# Patient Record
Sex: MALE | Race: WHITE | NOT HISPANIC OR LATINO | Employment: FULL TIME | URBAN - METROPOLITAN AREA
[De-identification: names, ages, dates, MRNs, and addresses within clinical notes are randomized per-mention and may not be internally consistent; named-entity substitution may affect disease eponyms.]

---
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
## 2018-01-02 ENCOUNTER — OFFICE VISIT (OUTPATIENT)
Dept: BEHAVIORAL HEALTH | Facility: PHYSICIAN GROUP | Age: 18
End: 2018-01-02
Payer: COMMERCIAL

## 2018-01-02 DIAGNOSIS — F41.1 GENERALIZED ANXIETY DISORDER: ICD-10-CM

## 2018-01-02 DIAGNOSIS — F32.9 MAJOR DEPRESSIVE DISORDER, SINGLE EPISODE WITH ANXIOUS DISTRESS: ICD-10-CM

## 2018-01-02 PROCEDURE — 90834 PSYTX W PT 45 MINUTES: CPT | Performed by: PSYCHOLOGIST

## 2018-01-02 NOTE — BH THERAPY
" [facility]  Therapy Progress Note    Patient Name: Anna Marie Prabhakar  Patient MRN: [id number]  Today's Date: 1/2/2018     Type of session:Individual psychotherapy  Length of session: 45 minutes  Persons in attendance:Patient    Subjective/New Info: Pt reports he's doing well, however he got an anonymous note wishing him a happy new year on his car and was a little fixated about if it came from his ex GF. Pt reports that he has already blocked her from his phone and social media and this is the only way to reach him. Disc how her actions are used to always draw him back into the drama and gave her a sense of power every time he allows himself to get drawn in. Pt on break, wants to do better in school next year and be more socially involved in school. Thinking of joining a  basketball league at school. Disc other ways he can get involved. Pt learning how to code from his father during break. Disc options for summer internships. 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 Still seems to be a little depressed in mood even though he denies this.     Objective/Observations:   Participation: Active verbal participation, Attentive and Engaged   Grooming: Casual   Cognition: Alert and Fully Oriented   Eye contact: Good   Mood: Depressed and Anxious   Affect: Constricted   Thought process: Logical   Speech: Rate within normal limits   Other:     Diagnoses:   1. Major depressive disorder, single episode with anxious distress    2. Generalized anxiety disorder         Current risk:   SUICIDE: Not applicable   Homicide: Not applicable   Self-harm: Not applicable   Relapse: Not applicable   Other:    Safety Plan reviewed? Not Indicated   If evidence of imminent risk is present, intervention/plan:     Therapeutic Intervention(s): Conflict clarification, Establish rapport, Goal-setting, Positive behavior reinforced, Self-care skills, Stressors assessed and Supportive psychotherapy    Treatment " Goal(s)/Objective(s) addressed: Tx plan:  - Utilize learned skills to manage mood more effectively  - Learn to successfully challenge & change distortions about self in thinking  - Identify goals/values and cultivate a meaningful life  - Learn to be more emotionally flexible/resilient in times of stress/challenges  - Eliminate SI & increase sense of hope/optimisim   - Learn to ameliorate effects of anxiety on life and functioning  - Increase behaviors of self-compassion and self-care      Progress toward Treatment Goals: Moderate improvement    Plan:  - Continue Individual therapy and Medication management    Marely Gupta, Ph.D.  1/2/2018

## 2018-01-18 ENCOUNTER — OFFICE VISIT (OUTPATIENT)
Dept: BEHAVIORAL HEALTH | Facility: PHYSICIAN GROUP | Age: 18
End: 2018-01-18
Payer: COMMERCIAL

## 2018-01-18 VITALS — RESPIRATION RATE: 14 BRPM | BODY MASS INDEX: 20.94 KG/M2 | HEIGHT: 73 IN | WEIGHT: 158 LBS

## 2018-01-18 DIAGNOSIS — F32.9 MAJOR DEPRESSIVE DISORDER, SINGLE EPISODE WITH ANXIOUS DISTRESS: ICD-10-CM

## 2018-01-18 DIAGNOSIS — F41.1 GENERALIZED ANXIETY DISORDER: ICD-10-CM

## 2018-01-18 PROCEDURE — 99213 OFFICE O/P EST LOW 20 MIN: CPT | Performed by: NURSE PRACTITIONER

## 2018-01-19 NOTE — PROGRESS NOTES
"[facility]  PSYCHIATRIC FOLLOW-UP NOTE    Name: Anna Marie Prabhakar  MRN: [id number]  : 2000  Age: 17 y.o.  Date of assessment: 2018  PCP: Gibran Pedraza M.D.  Persons in attendance: Patient  Total face-to-face time: 15 minutes    REASON FOR VISIT/CHIEF COMPLAINT (as stated by Patient):  Anna Marie Prabhakar is a 17 y.o., White male, attending follow-up appointment for depression and anxiety.      HISTORY OF PRESENT ILLNESS:    Patient reports overall he is feeling better. He did recently have a bad weekend with increased stress which caused him to feel low, but he was able to pull himself out of the low mood and his anxiety has since reduced as well. Continues to feel triggered by encounters with ex girlfriend, as she tends to engage in some manipulative behavior with him. With increased dose of medication finds it easier to stop obsessive thoughts and that if he has something that lowers his mood it is not lasting. Some fleeting passive suicidal ideation in response to stressors continues, but less than before medication. Sleeping 8 hours/night. Motivation to do things has improved and he is starting to enjoy things again.     PSYCHOSOCIAL CHANGES SINCE PREVIOUS CONTACT:  1 drink on New Years [place]. Lives with his parents. Full time student at college      MEDICATION SIDE EFFECTS: sweating and dry mouth    REVIEW OF SYSTEMS:      General appearance: casually dressed  male, good grooming/hygiene. Pleasant and cooperative   Constitutional negative - no fever/chills   Eyes not tested   Ears/Nose/Mouth/Throat not tested   Cardiovascular not tested   Respiratory not tested   Gastrointestinal negative   Genitourinary not tested   Muscular not tested   Integumentary not tested   Neurological negative   Endocrine not tested   Hematologic/Lymphatic not tested       PSYCHIATRIC EXAMINATION/MENTAL STATUS  Resp 14   Ht 1.85 m (6' 0.83\")   Wt 71.7 kg (158 lb)   BMI 20.94 kg/m²   Participation: Active " verbal participation, Attentive, Engaged and Open to feedback  Grooming:Casual and Neat  Orientation: Fully Oriented  Eye contact: Good  Behavior:Calm   Mood: Euthymic  Affect: Full range and Congruent with content  Thought process: Logical and Goal-directed  Thought content:  Within normal limits  Speech: Rate within normal limits and Volume within normal limits  Perception:  Within normal limits  Memory:  No gross evidence of memory deficits  Insight: Adequate  Judgment: Good  Family/couple interaction observations:   Other:    Current risk:    Suicide: Low   Homicide: Not applicable   Self-harm: Low  Relapse: Not applicable  Other:   Crisis Safety Plan reviewed?No  If evidence of imminent risk is present, intervention/plan:    Medical Records/Labs/Diagnostic Tests Reviewed: none    Medical Records/Labs/Diagnostic Tests Ordered: none    DIAGNOSTIC IMPRESSION(S):  1. Major depressive disorder, single episode with anxious distress    2. Generalized anxiety disorder           ASSESSMENT AND PLAN:  Continue with current medication. Discussed the automatic negative thoughts he has are good things to work on in therapy. Reviewed his side effect of sweating and dry mouth are common, patient verbalizes he is tolerating them and does not want to change medication.       Current Outpatient Prescriptions:   •  fluoxetine (PROZAC) 10 MG Cap, Take 3 Caps by mouth every day., Disp: 270 Cap, Rfl: 0      Recheck 2 months or sooner if needed    RETA Hassan.

## 2018-02-09 ENCOUNTER — OFFICE VISIT (OUTPATIENT)
Dept: BEHAVIORAL HEALTH | Facility: PHYSICIAN GROUP | Age: 18
End: 2018-02-09
Payer: COMMERCIAL

## 2018-02-09 DIAGNOSIS — F41.1 GENERALIZED ANXIETY DISORDER: ICD-10-CM

## 2018-02-09 DIAGNOSIS — F32.9 MAJOR DEPRESSIVE DISORDER, SINGLE EPISODE WITH ANXIOUS DISTRESS: ICD-10-CM

## 2018-02-09 PROCEDURE — 90834 PSYTX W PT 45 MINUTES: CPT | Performed by: PSYCHOLOGIST

## 2018-02-09 NOTE — BH THERAPY
[facility]  Therapy Progress Note    Patient Name: Anna Marie Prabhakar  Patient MRN: [id number]  Today's Date: 2/9/2018     Type of session:Individual psychotherapy  Length of session: 45 minutes  Persons in attendance:Patient    Subjective/New Info: Pt reports he's doing well, has told Ex when she gets in touch with him that he is no longer interested in cont a relationship with her, but conts to leave himself available for contact with her. Pt has felt happier. He is doing more socially - has started going pick-up basketball games and going to gym with a friend and going to a bible study group. Disc the changes in his insurance. Pt will talk to parents to see if he has out of network services. Disc possibility of using campus services as well.    Objective/Observations:   Participation: Active verbal participation, Attentive and Engaged   Grooming: Casual   Cognition: Alert and Fully Oriented   Eye contact: Good   Mood: Euthymic   Affect: Constricted   Thought process: Logical   Speech: Rate within normal limits   Other:     Diagnoses:   1. Major depressive disorder, single episode with anxious distress    2. Generalized anxiety disorder         Current risk:   SUICIDE: Not applicable   Homicide: Not applicable   Self-harm: Not applicable   Relapse: Not applicable   Other:    Safety Plan reviewed? Not Indicated   If evidence of imminent risk is present, intervention/plan:     Therapeutic Intervention(s): Conflict clarification, Establish rapport, Goal-setting, Positive behavior reinforced, Self-care skills, Stressors assessed and Supportive psychotherapy    Treatment Goal(s)/Objective(s) addressed: Tx plan:  - Utilize learned skills to manage mood more effectively  - Learn to successfully challenge & change distortions about self in thinking  - Identify goals/values and cultivate a meaningful life  - Learn to be more emotionally flexible/resilient in times of stress/challenges  - Eliminate SI & increase sense  of hope/optimisim   - Learn to ameliorate effects of anxiety on life and functioning  - Increase behaviors of self-compassion and self-care      Progress toward Treatment Goals: Moderate improvement    Plan:  - Continue Individual therapy and Medication management    Marely Gupta, Ph.D.  2/9/2018

## 2018-02-12 ENCOUNTER — OFFICE VISIT (OUTPATIENT)
Dept: BEHAVIORAL HEALTH | Facility: PHYSICIAN GROUP | Age: 18
End: 2018-02-12
Payer: COMMERCIAL

## 2018-02-12 DIAGNOSIS — F32.9 MAJOR DEPRESSIVE DISORDER, SINGLE EPISODE WITH ANXIOUS DISTRESS: ICD-10-CM

## 2018-02-12 DIAGNOSIS — F41.1 GENERALIZED ANXIETY DISORDER: ICD-10-CM

## 2018-02-12 PROCEDURE — 99212 OFFICE O/P EST SF 10 MIN: CPT | Performed by: NURSE PRACTITIONER

## 2018-02-13 NOTE — PROGRESS NOTES
[facility]  PSYCHIATRIC FOLLOW-UP NOTE    Name: Anna Marie Prabhakar  MRN: [id number]  : 2000  Age: 17 y.o.  Date of assessment: 2018  PCP: Gibran Pedraza M.D.  Persons in attendance: Patient, and NP student FRANCISCA Quick  Total face-to-face time: 10 minutes    REASON FOR VISIT/CHIEF COMPLAINT (as stated by Patient):  Anna Marie Prabhakar is a 17 y.o., White male, attending follow-up appointment for depression and anxiety.      HISTORY OF PRESENT ILLNESS:    Patient reports his mood is okay, denies feeling sad or down. Has not been obsessing about his ex, things are going well for him at school. Sleeping okay at night, appetite is good. No suicidal thoughts. No concerns or complaints about his medication fluoxetine. Patient wants to review his treatment plan for how often he will need to be seen, if he will be staying on medication indefinitely.     PSYCHOSOCIAL CHANGES SINCE PREVIOUS CONTACT:  Denies drinking or drug use      MEDICATION SIDE EFFECTS: sweating, patient says it is tolerable to him    REVIEW OF SYSTEMS:      General appearance: casually dressed  male, good grooming/hygiene. Pleasant and cooperative.   Constitutional negative - no fever/chills   Eyes not tested   Ears/Nose/Mouth/Throat not tested   Cardiovascular not tested   Respiratory not tested   Gastrointestinal negative - denies GI upset   Genitourinary not tested   Muscular not tested   Integumentary not tested   Neurological negative   Endocrine not tested   Hematologic/Lymphatic not tested       PSYCHIATRIC EXAMINATION/MENTAL STATUS  There were no vitals taken for this visit.  Participation: Active verbal participation  Grooming:Casual and Neat  Orientation: Fully Oriented  Eye contact: Good  Behavior:Calm   Mood: Euthymic  Affect: Full range and Congruent with content  Thought process: Logical and Goal-directed  Thought content:  Within normal limits  Speech: Rate within normal limits and Volume within normal limits  Perception:   Within normal limits  Memory:  No gross evidence of memory deficits  Insight: Good  Judgment: Good  Family/couple interaction observations:   Other:    Current risk:    Suicide: Low   Homicide: Not applicable   Self-harm: Low  Relapse: Not applicable  Other:   Crisis Safety Plan reviewed?No  If evidence of imminent risk is present, intervention/plan:    Medical Records/Labs/Diagnostic Tests Reviewed: none    Medical Records/Labs/Diagnostic Tests Ordered: none    DIAGNOSTIC IMPRESSION(S):  1. Major depressive disorder, single episode with anxious distress    2. Generalized anxiety disorder           ASSESSMENT AND PLAN: depression and anxiety are improved.   Continue fluoxetine at current dose. Discussed treatment plan with patient that we will keep him on his medication through one year of remission before trial at titrating off of meds. Encourage patient to continue with individual therapy, will be helpful to have a solid foundation of coping skills before thinking of going without medication in the future. Reviewed he can be seen every 3 months if his mood is stable, eventually every 6 months as long as his mood is good and he is not having problems with medication. Patient advised to call and schedule a visit if he is having changes or worsening mood.       Current Outpatient Prescriptions:   •  fluoxetine (PROZAC) 10 MG Cap, Take 3 Caps by mouth every day., Disp: 270 Cap, Rfl: 0    Recheck 3-4 months or sooner if needed    RETA Hassan.

## 2018-04-06 RX ORDER — FLUOXETINE 10 MG/1
CAPSULE ORAL
Qty: 270 CAP | Refills: 0 | Status: SHIPPED | OUTPATIENT
Start: 2018-04-06 | End: 2018-06-04

## 2018-04-16 RX ORDER — FLUOXETINE 10 MG/1
CAPSULE ORAL
Qty: 270 CAP | Refills: 0 | Status: SHIPPED | OUTPATIENT
Start: 2018-04-16 | End: 2018-06-04

## 2018-06-04 ENCOUNTER — OFFICE VISIT (OUTPATIENT)
Dept: BEHAVIORAL HEALTH | Facility: PHYSICIAN GROUP | Age: 18
End: 2018-06-04
Payer: COMMERCIAL

## 2018-06-04 DIAGNOSIS — F32.9 MAJOR DEPRESSIVE DISORDER, SINGLE EPISODE WITH ANXIOUS DISTRESS: ICD-10-CM

## 2018-06-04 DIAGNOSIS — F41.1 GENERALIZED ANXIETY DISORDER: ICD-10-CM

## 2018-06-04 PROCEDURE — 99213 OFFICE O/P EST LOW 20 MIN: CPT | Performed by: NURSE PRACTITIONER

## 2018-06-04 RX ORDER — FLUOXETINE HYDROCHLORIDE 40 MG/1
40 CAPSULE ORAL DAILY
Qty: 90 CAP | Refills: 0 | Status: SHIPPED | OUTPATIENT
Start: 2018-06-04

## 2018-06-05 NOTE — PROGRESS NOTES
"[facility]  PSYCHIATRIC FOLLOW-UP NOTE    Name: Anna Marie Prabhakar  MRN: [id number]  : 2000  Age: 17 y.o.  Date of assessment: 2018  PCP: Gibran Pedraza M.D.  Persons in attendance: Patient  Total face-to-face time: 20 minutes    REASON FOR VISIT/CHIEF COMPLAINT (as stated by Patient):  Anna Marie Prabhakar is a 17 y.o., White male, attending follow-up appointment for recheck of depression and anxiety.      HISTORY OF PRESENT ILLNESS:    Anna Marie complains his mood has been a little lower since he finished school this semester. Grades ended up okay, says he really likes college and his classes and learning. He has had feelings of indifference, says he does not have suicidal ideation or thoughts of harming himself, but he doesn't care at times if he lives. He does enjoy time out with his friends, is working 2 jobs right now. Anxiety remains improved, has some situational anxiety at times but nothing that \"keeps me awake at night\" or is interfering with his level of function. Since his insurance became out of network he has not been seeing a therapist. Continues to obsess about his ex girlfriend, has difficulty stopping thinking about her when she does things like attempt to contact him through social media.    PSYCHOSOCIAL CHANGES SINCE PREVIOUS CONTACT:  No drinking or drug use      MEDICATION SIDE EFFECTS: sweating    REVIEW OF SYSTEMS:    General appearance: casually dressed      Constitutional negative - no fever/chills   Eyes not tested   Ears/Nose/Mouth/Throat not tested   Cardiovascular not tested   Respiratory negative - no shortness of breath or acute distress   Gastrointestinal negative   Genitourinary not tested   Muscular not tested   Integumentary not tested   Neurological negative   Endocrine not tested   Hematologic/Lymphatic not tested       PSYCHIATRIC EXAMINATION/MENTAL STATUS  There were no vitals taken for this visit.  Participation: Active verbal participation, Attentive, " Engaged and Open to feedback  Grooming:Casual and Neat  Orientation: Fully Oriented  Eye contact: Good  Behavior:Calm   Mood: Depressed  Affect: Full range and Congruent with content  Thought process: Logical and Goal-directed  Thought content:  Within normal limits  Speech: Rate within normal limits and Volume within normal limits  Perception:  Within normal limits  Memory:  No gross evidence of memory deficits  Insight: Adequate  Judgment: Good  Family/couple interaction observations:   Other:    Current risk:    Suicide: Low   Homicide: Not applicable   Self-harm: Low  Relapse: Not applicable  Other:   Crisis Safety Plan reviewed?No  If evidence of imminent risk is present, intervention/plan:    Medical Records/Labs/Diagnostic Tests Reviewed: none    Medical Records/Labs/Diagnostic Tests Ordered: none    DIAGNOSTIC IMPRESSION(S):  1. Major depressive disorder, single episode with anxious distress    2. Generalized anxiety disorder           ASSESSMENT AND PLAN:  -depression is mild but persisting, anxiety has improved.   -increase fluoxetine to 40 mg daily for symptoms  -stress need to get in with a therapist in network, is given a couple of names he can try, needs to continue to work on stopping thoughts about ex girlfriend and using boundaries with her.       Current Outpatient Prescriptions:   •  fluoxetine (PROZAC) 40 MG capsule, Take 1 Cap by mouth every day., Disp: 90 Cap, Rfl: 0    Recheck 4 weeks or sooner if needed    RETA Hassan.

## 2018-06-28 ENCOUNTER — OFFICE VISIT (OUTPATIENT)
Dept: BEHAVIORAL HEALTH | Facility: PHYSICIAN GROUP | Age: 18
End: 2018-06-28
Payer: COMMERCIAL

## 2018-06-28 DIAGNOSIS — F32.9 MAJOR DEPRESSIVE DISORDER, SINGLE EPISODE WITH ANXIOUS DISTRESS: ICD-10-CM

## 2018-06-28 DIAGNOSIS — F41.1 GENERALIZED ANXIETY DISORDER: ICD-10-CM

## 2018-06-28 PROCEDURE — 99213 OFFICE O/P EST LOW 20 MIN: CPT | Performed by: NURSE PRACTITIONER

## 2018-06-28 NOTE — PROGRESS NOTES
[facility]  PSYCHIATRIC FOLLOW-UP NOTE    Name: Anna Marie Prabhakar  MRN: [id number]  : 2000  Age: 17 y.o.  Date of assessment: 2018  PCP: Gibran Pedraza M.D.  Persons in attendance: Patient  Total face-to-face time: 13 minutes    REASON FOR VISIT/CHIEF COMPLAINT (as stated by Patient):  Anna Marie Prabhakar is a 17 y.o., White male, attending follow-up appointment for recheck of depression and anxiety.      HISTORY OF PRESENT ILLNESS:    Anna Marie reports his mood is improved with increased dose of fluoxetine. He is feeling less down, more interested in doing things again and is going skydiving in the next week with his stepfather, something he has wanted to do for a long time. Still continues to dwell on negative thoughts when he is not busy, finds that he thinks often of his ex girlfriend when he is not busy with work or friends. Denies suicidal thoughts. Appetite and sleep have been okay.     PSYCHOSOCIAL CHANGES SINCE PREVIOUS CONTACT:  Denies drinking      MEDICATION SIDE EFFECTS: GI upset, less if he takes fluoxetine with food    REVIEW OF SYSTEMS:      General appearance: casually dressed  female, good grooming/hygiene. Pleasant and cooperative.   Constitutional negative - no fever/chills   Eyes not tested   Ears/Nose/Mouth/Throat not tested   Cardiovascular not tested   Respiratory negative - no shortness of breath or acute distress   Gastrointestinal Some GI upset,    Genitourinary not tested   Muscular not tested   Integumentary not tested   Neurological negative   Endocrine not tested   Hematologic/Lymphatic not tested       PSYCHIATRIC EXAMINATION/MENTAL STATUS  There were no vitals taken for this visit.  Participation: Active verbal participation, Attentive, Engaged and Open to feedback  Grooming:Casual and Neat  Orientation: Fully Oriented  Eye contact: Good  Behavior:Calm   Mood: Euthymic  Affect: Full range and Congruent with content  Thought process: Logical and  Goal-directed  Thought content:  Within normal limits  Speech: Rate within normal limits and Volume within normal limits  Perception:  Within normal limits  Memory:  No gross evidence of memory deficits  Insight: Good  Judgment: Good  Family/couple interaction observations:   Other:    Current risk:    Suicide: Low   Homicide: Not applicable   Self-harm: Not applicable  Relapse: Not applicable  Other:   Crisis Safety Plan reviewed?No  If evidence of imminent risk is present, intervention/plan:    Medical Records/Labs/Diagnostic Tests Reviewed: none    Medical Records/Labs/Diagnostic Tests Ordered: none    DIAGNOSTIC IMPRESSION(S):  1. Major depressive disorder, single episode with anxious distress    2. Generalized anxiety disorder           ASSESSMENT AND PLAN:  Mood is improved, continue with fluoxetine 40 mg and continue to monitor his mood.  -encouraged to continue to work on thought stopping when he is dwelling on his ex girlfriend  -patient will work at establishing with a therapist, feels his schedule will allow him to get some regularly scheduled therapy visits in      Current Outpatient Prescriptions:   •  fluoxetine (PROZAC) 40 MG capsule, Take 1 Cap by mouth every day., Disp: 90 Cap, Rfl: 0    Recheck approximately 8 weeks or sooner if needed    RETA Hassan.

## 2018-09-07 ENCOUNTER — APPOINTMENT (OUTPATIENT)
Dept: BEHAVIORAL HEALTH | Facility: PHYSICIAN GROUP | Age: 18
End: 2018-09-07
Payer: COMMERCIAL

## 2020-03-17 ENCOUNTER — TELEPHONE (OUTPATIENT)
Dept: HEALTH INFORMATION MANAGEMENT | Facility: OTHER | Age: 20
End: 2020-03-17

## 2020-03-17 NOTE — TELEPHONE ENCOUNTER
1. Caller Name: Anna Marie                        Call Back Number: [phone]  [facility] PCP or Specialty Provider: Yes Dr. Pedraza        2.  Does patient have any active symptoms of respiratory illness (fever OR cough OR shortness of breath)? Yes, the patient reports the following respiratory symptoms: cough x 4 days. No fever, No SOB    3.  Does patient have any comoribidities? None     4.  In the last 30 days, has the patient traveled outside of the country OR in a high risk area within the [place] OR have any known contact with someone who has or is suspected to have COVID-19?  Yes, returned from [place] last night. Studying abroad but program cancelled. [employer] put on 14 day quarantine.      5. POTENTIAL PUI (LOW): Advised patient to contact [facility] ( [phone]) for additional screening; patient also given self care instructions        Note routed to PCP: FYI only.

## 2020-03-21 ENCOUNTER — OFFICE VISIT (OUTPATIENT)
Dept: URGENT CARE | Facility: CLINIC | Age: 20
End: 2020-03-21
Payer: COMMERCIAL

## 2020-03-21 VITALS
HEART RATE: 89 BPM | OXYGEN SATURATION: 95 % | RESPIRATION RATE: 16 BRPM | TEMPERATURE: 99.4 F | HEIGHT: 72 IN | BODY MASS INDEX: 21.67 KG/M2 | WEIGHT: 160 LBS

## 2020-03-21 DIAGNOSIS — B97.89 VIRAL RESPIRATORY ILLNESS: ICD-10-CM

## 2020-03-21 DIAGNOSIS — J98.8 VIRAL RESPIRATORY ILLNESS: ICD-10-CM

## 2020-03-21 DIAGNOSIS — R05.9 COUGH: ICD-10-CM

## 2020-03-21 PROBLEM — M25.319 INSTABILITY OF SHOULDER JOINT: Status: ACTIVE | Noted: 2019-11-14

## 2020-03-21 LAB
FLUAV+FLUBV AG SPEC QL IA: NORMAL
INT CON NEG: NEGATIVE
INT CON NEG: NEGATIVE
INT CON POS: POSITIVE
INT CON POS: POSITIVE
S PYO AG THROAT QL: NORMAL

## 2020-03-21 PROCEDURE — 87880 STREP A ASSAY W/OPTIC: CPT | Performed by: NURSE PRACTITIONER

## 2020-03-21 PROCEDURE — 99203 OFFICE O/P NEW LOW 30 MIN: CPT | Performed by: NURSE PRACTITIONER

## 2020-03-21 PROCEDURE — 87804 INFLUENZA ASSAY W/OPTIC: CPT | Performed by: NURSE PRACTITIONER

## 2020-03-21 ASSESSMENT — ENCOUNTER SYMPTOMS
FOCAL WEAKNESS: 0
HEADACHES: 1
FEVER: 1
SPEECH CHANGE: 0
MYALGIAS: 1
SENSORY CHANGE: 0
SINUS PAIN: 0
WHEEZING: 0
SHORTNESS OF BREATH: 0
SORE THROAT: 1
WEAKNESS: 0
SPUTUM PRODUCTION: 0
COUGH: 1
HEMOPTYSIS: 0

## 2020-03-21 NOTE — PATIENT INSTRUCTIONS
Travel to high risk area or direct contact with known COVID-19 positive person: Self isolation for 14 days from onset of symptoms. Stay home while symptomatic. .    For specific questions regarding COVID-19 testing: reach out to your local health department or utilize their online resources. For [facility] fill out the online survey or call [phone].       Viral Respiratory Infection  A respiratory infection is an illness that affects part of the respiratory system, such as the lungs, nose, or throat. Most respiratory infections are caused by either viruses or bacteria. A respiratory infection that is caused by a virus is called a viral respiratory infection.  Common types of viral respiratory infections include:  · A cold.  · The flu (influenza).  · A respiratory syncytial virus (RSV) infection.  How do I know if I have a viral respiratory infection?  Most viral respiratory infections cause:  · A stuffy or runny nose.  · Yellow or green nasal discharge.  · A cough.  · Sneezing.  · Fatigue.  · Achy muscles.  · A sore throat.  · Sweating or chills.  · A fever.  · A headache.  How are viral respiratory infections treated?  If influenza is diagnosed early, it may be treated with an antiviral medicine that shortens the length of time a person has symptoms. Symptoms of viral respiratory infections may be treated with over-the-counter and prescription medicines, such as:  · Expectorants. These make it easier to cough up mucus.  · Decongestant nasal sprays.  Health care providers do not prescribe antibiotic medicines for viral infections. This is because antibiotics are designed to kill bacteria. They have no effect on viruses.  How do I know if I should stay home from work or school?  To avoid exposing others to your respiratory infection, stay home if you have:  · A fever.  · A persistent cough.  · A sore throat.  · A runny nose.  · Sneezing.  · Muscles  aches.  · Headaches.  · Fatigue.  · Weakness.  · Chills.  · Sweating.  · Nausea.  Follow these instructions at home:  · Rest as much as possible.  · Take over-the-counter and prescription medicines only as told by your health care provider.  · Drink enough fluid to keep your urine clear or pale yellow. This helps prevent dehydration and helps loosen up mucus.  · Gargle with a salt-water mixture 3-4 times per day or as needed. To make a salt-water mixture, completely dissolve ½-1 tsp of salt in 1 cup of warm water.  · Use nose drops made from salt water to ease congestion and soften raw skin around your nose.  · Do not drink alcohol.  · Do not use tobacco products, including cigarettes, chewing tobacco, and e-cigarettes. If you need help quitting, ask your health care provider.  Contact a health care provider if:  · Your symptoms last for 10 days or longer.  · Your symptoms get worse over time.  · You have a fever.  · You have severe sinus pain in your face or forehead.  · The glands in your jaw or neck become very swollen.  Get help right away if:  · You feel pain or pressure in your chest.  · You have shortness of breath.  · You faint or feel like you will faint.  · You have severe and persistent vomiting.  · You feel confused or disoriented.  This information is not intended to replace advice given to you by your health care provider. Make sure you discuss any questions you have with your health care provider.  Document Released: 09/27/2006 Document Revised: 05/25/2017 Document Reviewed: 05/25/2016  [employer] Patient Education © 2017 [employer]    Self-Isolating at Home  If it is determined that you do not need to be hospitalized and can be isolated at home please follow the follow the prevention steps below as based on CDC guidelines.  Stay home except to get medical care  People who are mildly ill with unconfirmed COVID-19 or have any other infectious respiratory illness are able to isolate at home  during their illness. You should restrict activities outside your home, except for getting medical care. Do not go to work, school, or public areas. Avoid using public transportation, ride-sharing, or taxis.  Call ahead before visiting your doctor  If you have a medical appointment, call the healthcare provider and tell them that you have or may have unconfirmed COVID-19 or another possibly contagious respiratory illness. This will help the healthcare provider’s office take steps to keep other people from getting infected or exposed.  Separate yourself from other people and animals in your home  As much as possible, you should stay in a specific room and away from other people in your home. Also, you should use a separate bathroom, if available.  You should restrict contact with pets and other animals while you are sick, just like you would around other people. When possible, have another member of your household care for your animals while you are sick. If you must care for your pet or be around animals while you are sick, wash your hands before and after you interact with pets.  Wear a facemask  You should wear a facemask when you are around other people (e.g., sharing a room or vehicle) or pets and before you enter a healthcare provider’s office. If you are not able to wear a facemask (for example, because it causes trouble breathing), then people who live with you should not stay in the same room with you, or they should wear a facemask if they enter your room.  Cover your coughs and sneezes  Cover your mouth and nose with a tissue when you cough or sneeze. Throw used tissues in a lined trash can. Immediately wash your hands with soap and water for at least 20 seconds or, if soap and water are not available, clean your hands with an alcohol-based hand  that contains at least 60% alcohol.  Clean your hands often  Wash your hands often with soap and water for at least 20 seconds, especially after blowing  your nose, coughing, or sneezing; going to the bathroom; and before eating or preparing food. If soap and water are not readily available, use an alcohol-based hand  with at least 60% alcohol, covering all surfaces of your hands and rubbing them together until they feel dry.  Soap and water are the best option if hands are visibly dirty. Avoid touching your eyes, nose, and mouth with unwashed hands.  Avoid sharing personal household items  You should not share dishes, drinking glasses, cups, eating utensils, towels, or bedding with other people or pets in your home. After using these items, they should be washed thoroughly with soap and water.  Clean all “high-touch” surfaces everyday  High touch surfaces include counters, tabletops, doorknobs, bathroom fixtures, toilets, phones, keyboards, tablets, and bedside tables. Also, clean any surfaces that may have blood, stool, or body fluids on them. Use a household cleaning spray or wipe, according to the label instructions. Labels contain instructions for safe and effective use of the cleaning product including precautions you should take when applying the product, such as wearing gloves and making sure you have good ventilation during use of the product.  Monitor your symptoms  Seek prompt medical attention if your illness is worsening (e.g., difficulty breathing). Before seeking care, call your healthcare provider and tell them that you have, or are being evaluated for, unconfirmed COVID-19 or another infectious respiratory illness. Put on a facemask before you enter the facility. These steps will help the healthcare provider’s office to keep other people in the office or waiting room from getting infected or exposed. Ask your healthcare provider to call the local or [facility] health department. Persons who are placed under active monitoring or facilitated self-monitoring should follow instructions provided by their local health department or occupational health  professionals, as appropriate. When working with your local health department check their available hours.  If you have a medical emergency and need to call 911, notify the dispatch personnel that you have, or are being evaluated for unconfirmed COVID-19 or another infectious respiratory illness. If possible, put on a facemask before emergency medical services arrive.  Discontinuing home isolation  Patients with unconfirmed COVID-19 or other infectious respiratory illnesses should remain under home isolation precautions until the risk of secondary transmission to others is thought to be low. In general that means 72 hours after fever resolves without the use of fever reducing medications, AND symptoms have improved AND at least 7 days since symptoms first appeared. If you have questions or concerns consult your healthcare providers or your local health department.  Per CDC guidelines, you are not required to provide a healthcare provider’s note to validate your illness or to return to work, as healthcare provider offices and medical facilities may be extremely busy and not able to provide such documentation in a timely way.

## 2020-03-21 NOTE — PROGRESS NOTES
"  Subjective:     Anna Marie Prabhakar is a 19 y.o. male who presents for Cough (travelled to [place] came back 3/16/2020 - symptoms started 3/13/2020 - Cough, fever, Chest pressure, body aches, sore throat)      In [place] 1/2/2020-till 3/16/2020. Study abroad in [place]. Also traveled to [place], [place], [place]. Last in [place] 3/15.  Symptoms started 3/13. Wet cough initially, 5 days. Fevers, sore throat, very weak cough, chest heaviness now. No SOB. Last fever last night, unknown temp. Body aches. Pain 3/10. No known COVID exposure. Cough was initially productive. Taken Tylenol. Reached out to [facility], told he would not be tested, \"Saving for elderly\". Concerned since he has a immunocompromised brother. Has been staying in a hotel, away form family.     Cough   This is a new problem. The current episode started 1 to 4 weeks ago. The problem has been gradually improving. The cough is non-productive. Associated symptoms include a fever, headaches, myalgias and a sore throat. Pertinent negatives include no ear pain, hemoptysis, rash, shortness of breath or wheezing. Nothing aggravates the symptoms. Risk factors for lung disease include travel. He has tried rest for the symptoms. The treatment provided moderate relief. There is no history of asthma.       Past Medical History:   Diagnosis Date   • Anxiety    • Depression        Past Surgical History:   Procedure Laterality Date   • FRENULUM CLIPPING  2009       Social History     Socioeconomic History   • Marital status: Single     Spouse name: Not on file   • Number of children: Not on file   • Years of education: Not on file   • Highest education level: Not on file   Occupational History   • Not on file   Social Needs   • Financial resource strain: Not on file   • Food insecurity     Worry: Not on file     Inability: Not on file   • Transportation needs     Medical: Not on file     Non-medical: Not on file   Tobacco Use   • Smoking status: Current Some Day Smoker "   • Smokeless tobacco: Never Used   • Tobacco comment: vapes, quit cigarettes   Substance and Sexual Activity   • Alcohol use: Yes     Comment: 1 drink on New Year's [employer]   • Drug use: No     Comment: denies    • Sexual activity: Not on file   Lifestyle   • Physical activity     Days per week: Not on file     Minutes per session: Not on file   • Stress: Not on file   Relationships   • Social connections     Talks on phone: Not on file     Gets together: Not on file     Attends [employer] service: Not on file     Active member of club or organization: Not on file     Attends meetings of clubs or organizations: Not on file     Relationship status: Not on file   • Intimate partner violence     Fear of current or ex partner: Not on file     Emotionally abused: Not on file     Physically abused: Not on file     Forced sexual activity: Not on file   Other Topics Concern   • Not on file   Social History Narrative   • Not on file        Family History   Problem Relation Age of Onset   • Anxiety disorder Mother    • Drug abuse Brother    • Anxiety disorder Maternal Grandfather         No Known Allergies    Review of Systems   Constitutional: Positive for fever.   HENT: Positive for sore throat. Negative for ear discharge, ear pain and sinus pain.    Respiratory: Positive for cough. Negative for hemoptysis, sputum production, shortness of breath and wheezing.    Musculoskeletal: Positive for myalgias.   Skin: Negative for rash.   Neurological: Positive for headaches. Negative for sensory change, speech change, focal weakness and weakness.   All other systems reviewed and are negative.       Objective:   Pulse 89   Temp 37.4 °C (99.4 °F) (Temporal)   Resp 16   Ht 1.829 m (6')   Wt 72.6 kg (160 lb)   SpO2 95%   BMI 21.70 kg/m²     Physical Exam  Vitals signs reviewed.   Constitutional:       General: He is not in acute distress.     Appearance: Normal appearance. He is well-developed. He is not toxic-appearing.   HENT:       Head: Normocephalic and atraumatic.      Right Ear: Tympanic membrane, ear canal and external ear normal.      Left Ear: Tympanic membrane, ear canal and external ear normal.      Nose: Mucosal edema present.      Right Sinus: No maxillary sinus tenderness or frontal sinus tenderness.      Left Sinus: No maxillary sinus tenderness or frontal sinus tenderness.      Mouth/Throat:      Mouth: Mucous membranes are moist.      Pharynx: Uvula midline. Posterior oropharyngeal erythema present. No pharyngeal swelling or uvula swelling.      Comments: Slight oropharyngeal erythema.   Eyes:      Conjunctiva/sclera: Conjunctivae normal.   Neck:      Musculoskeletal: Normal range of motion and neck supple. No neck rigidity.   Cardiovascular:      Rate and Rhythm: Normal rate and regular rhythm.   Pulmonary:      Effort: Pulmonary effort is normal. No accessory muscle usage, prolonged expiration, respiratory distress or retractions.      Breath sounds: Normal breath sounds. No stridor. No decreased breath sounds, wheezing, rhonchi or rales.   Musculoskeletal: Normal range of motion.   Lymphadenopathy:      Head:      Right side of head: No submental, submandibular, tonsillar, preauricular, posterior auricular or occipital adenopathy.      Left side of head: No submental, submandibular, tonsillar, preauricular, posterior auricular or occipital adenopathy.   Skin:     General: Skin is warm and dry.      Findings: No rash.   Neurological:      General: No focal deficit present.      Mental Status: He is alert and oriented to person, place, and time.      GCS: GCS eye subscore is 4. GCS verbal subscore is 5. GCS motor subscore is 6.   Psychiatric:         Mood and Affect: Mood normal.         Speech: Speech normal.         Behavior: Behavior normal.         Thought Content: Thought content normal.         Judgment: Judgment normal.         Assessment/Plan:   1. Viral respiratory illness    2. Cough  - POCT Influenza A/B  - POCT  Rapid Strep A  Results for orders placed or performed in visit on 03/21/20   POCT Influenza A/B   Result Value Ref Range    Rapid Influenza A-B NEG     Internal Control Positive Positive     Internal Control Negative Negative    POCT Rapid Strep A   Result Value Ref Range    Rapid Strep Screen NEG     Internal Control Positive Positive     Internal Control Negative Negative      Travel to high risk area or direct contact with known COVID-19 positive person: Self isolation for 14 days from onset of symptoms. Stay home while symptomatic. For specific questions regarding COVID-19 testing: reach out to your local health department or utilize their online resources. For [facility] fill out the online survey or call [phone].    -Discussed viral etiology.    Symptomatic care.  -Oral hydration and rest.   -Over the counter expectorant as directed; Guaifenesin (Mucinex).  -Diphenhydramine as directed for rhinorrhea (runny nose) and sneezing.  --May take over the counter pseudoephedrine for nasal congestion. Can increase your blood pressure.  -Tylenol or ibuprofen for pain and fever as directed.   -Saline nasal spray as a decongestant.    Follow up for shortness of breath, fevers, elevated heart rate, weakness, prolonged cough, chest pain, or any other concerns.    Differential diagnosis, natural history, supportive care, and indications for immediate follow-up discussed.

## 2021-04-03 ENCOUNTER — IMMUNIZATION (OUTPATIENT)
Dept: FAMILY PLANNING/WOMEN'S HEALTH CLINIC | Facility: IMMUNIZATION CENTER | Age: 21
End: 2021-04-03
Payer: COMMERCIAL

## 2021-04-03 DIAGNOSIS — Z23 ENCOUNTER FOR VACCINATION: Primary | ICD-10-CM

## 2021-04-03 PROCEDURE — 91300 PFIZER SARS-COV-2 VACCINE: CPT

## 2021-04-03 PROCEDURE — 0001A PFIZER SARS-COV-2 VACCINE: CPT

## 2021-04-24 ENCOUNTER — IMMUNIZATION (OUTPATIENT)
Dept: FAMILY PLANNING/WOMEN'S HEALTH CLINIC | Facility: IMMUNIZATION CENTER | Age: 21
End: 2021-04-24
Payer: COMMERCIAL

## 2021-04-24 DIAGNOSIS — Z23 ENCOUNTER FOR VACCINATION: Primary | ICD-10-CM

## 2021-04-24 PROCEDURE — 0002A PFIZER SARS-COV-2 VACCINE: CPT

## 2021-04-24 PROCEDURE — 91300 PFIZER SARS-COV-2 VACCINE: CPT

## 2021-08-07 ENCOUNTER — HOSPITAL ENCOUNTER (EMERGENCY)
Facility: MEDICAL CENTER | Age: 21
End: 2021-08-07
Attending: EMERGENCY MEDICINE
Payer: COMMERCIAL

## 2021-08-07 VITALS
SYSTOLIC BLOOD PRESSURE: 132 MMHG | HEART RATE: 74 BPM | DIASTOLIC BLOOD PRESSURE: 83 MMHG | BODY MASS INDEX: 24.46 KG/M2 | WEIGHT: 180.56 LBS | OXYGEN SATURATION: 97 % | RESPIRATION RATE: 18 BRPM | HEIGHT: 72 IN | TEMPERATURE: 98.8 F

## 2021-08-07 DIAGNOSIS — S01.81XA FACIAL LACERATION, INITIAL ENCOUNTER: ICD-10-CM

## 2021-08-07 PROCEDURE — 700111 HCHG RX REV CODE 636 W/ 250 OVERRIDE (IP): Performed by: EMERGENCY MEDICINE

## 2021-08-07 PROCEDURE — 99282 EMERGENCY DEPT VISIT SF MDM: CPT

## 2021-08-07 PROCEDURE — 90471 IMMUNIZATION ADMIN: CPT

## 2021-08-07 PROCEDURE — 90715 TDAP VACCINE 7 YRS/> IM: CPT | Performed by: EMERGENCY MEDICINE

## 2021-08-07 PROCEDURE — 304217 HCHG IRRIGATION SYSTEM

## 2021-08-07 PROCEDURE — 303747 HCHG EXTRA SUTURE

## 2021-08-07 PROCEDURE — 304999 HCHG REPAIR-SIMPLE/INTERMED LEVEL 1

## 2021-08-07 PROCEDURE — 700101 HCHG RX REV CODE 250: Performed by: EMERGENCY MEDICINE

## 2021-08-07 RX ORDER — LIDOCAINE HYDROCHLORIDE 10 MG/ML
5 INJECTION, SOLUTION INFILTRATION; PERINEURAL ONCE
Status: COMPLETED | OUTPATIENT
Start: 2021-08-07 | End: 2021-08-07

## 2021-08-07 RX ADMIN — LIDOCAINE HYDROCHLORIDE 5 ML: 10 INJECTION, SOLUTION INFILTRATION; PERINEURAL at 02:44

## 2021-08-07 RX ADMIN — CLOSTRIDIUM TETANI TOXOID ANTIGEN (FORMALDEHYDE INACTIVATED), CORYNEBACTERIUM DIPHTHERIAE TOXOID ANTIGEN (FORMALDEHYDE INACTIVATED), BORDETELLA PERTUSSIS TOXOID ANTIGEN (GLUTARALDEHYDE INACTIVATED), BORDETELLA PERTUSSIS FILAMENTOUS HEMAGGLUTININ ANTIGEN (FORMALDEHYDE INACTIVATED), BORDETELLA PERTUSSIS PERTACTIN ANTIGEN, AND BORDETELLA PERTUSSIS FIMBRIAE 2/3 ANTIGEN 0.5 ML: 5; 2; 2.5; 5; 3; 5 INJECTION, SUSPENSION INTRAMUSCULAR at 03:00

## 2021-08-07 ASSESSMENT — LIFESTYLE VARIABLES
CONSUMPTION TOTAL: NEGATIVE
TOTAL SCORE: 0
HAVE PEOPLE ANNOYED YOU BY CRITICIZING YOUR DRINKING: NO
EVER HAD A DRINK FIRST THING IN THE MORNING TO STEADY YOUR NERVES TO GET RID OF A HANGOVER: NO
AVERAGE NUMBER OF DAYS PER WEEK YOU HAVE A DRINK CONTAINING ALCOHOL: 0
HAVE YOU EVER FELT YOU SHOULD CUT DOWN ON YOUR DRINKING: NO
HOW MANY TIMES IN THE PAST YEAR HAVE YOU HAD 5 OR MORE DRINKS IN A DAY: 0
ON A TYPICAL DAY WHEN YOU DRINK ALCOHOL HOW MANY DRINKS DO YOU HAVE: 1
DO YOU DRINK ALCOHOL: YES
DOES PATIENT WANT TO STOP DRINKING: NO
TOTAL SCORE: 0
TOTAL SCORE: 0
EVER FELT BAD OR GUILTY ABOUT YOUR DRINKING: NO

## 2021-08-07 ASSESSMENT — PAIN DESCRIPTION - PAIN TYPE: TYPE: ACUTE PAIN
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
## 2021-08-07 NOTE — ED NOTES
Discharge instructions given to patient. Education provided on diagnosis, treatment, and follow up provided. Pt verbalized understanding. All questions answered. Pt ambulatory to lobby.
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
## 2023-09-20 NOTE — PROGRESS NOTES
"[facility]  PSYCHIATRIC FOLLOW-UP NOTE    Name: Anna Marie Prabhakar  MRN: [id number]  : 2000  Age: 16 y.o.  Date of assessment: 2017  PCP: Gibran Pedraza M.D.  Persons in attendance: Patient  REASON FOR VISIT/CHIEF COMPLAINT (as stated by Patient):  Anna Marie Prabhakar is a 16 y.o., White male, attending follow-up appointment for   Chief Complaint   Patient presents with   • Medication Management     The patient is seen today for follow up and he is taking lexapro 10 mg per day.   .      HISTORY OF PRESENT ILLNESS:    This is a 17 yo male, seen today for follow up. The patient was seen for initial evaluation one month ago and he was started on lexapro. The patient reported that he is taking it everyday and he denied any side effects. The patient has dee very active and he has been helping his uncle.  The patient has been sleeping better and he is trying to move on.     PSYCHOSOCIAL CHANGES SINCE PREVIOUS CONTACT:  The patient started to feel better.    RESPONSE TO TREATMENT:  Good.    MEDICATION SIDE EFFECTS:  Denied.    REVIEW OF SYSTEMS:        Constitutional negative   Eyes negative   Ears/Nose/Mouth/Throat negative   Cardiovascular negative   Respiratory negative   Gastrointestinal negative   Genitourinary negative   Muscular negative   Integumentary negative   Neurological negative   Endocrine negative   Hematologic/Lymphatic negative       PSYCHIATRIC EXAMINATION/MENTAL STATUS  /78 mmHg  Pulse 80  Temp(Src) 36.6 °C (97.9 °F)  Resp 14  Ht 1.85 m (6' 0.83\")  Wt 70.308 kg (155 lb)  BMI 20.54 kg/m2  Participation: Active verbal participation, Attentive and Engaged  Grooming:Casual  Orientation: Alert and Fully Oriented  Eye contact: Good  Behavior:Calm   Mood: Anxious  Affect: Anxious  Thought process: Logical and Goal-directed  Thought content:  Within normal limits  Speech: Rate within normal limits and Volume within normal limits  Perception:  Within normal limits  Memory:  No gross " FYI monitoring a left upper lobe nodule.  Likely heading towards biopsy at some point. evidence of memory deficits  Insight: Good  Judgment: Good  Family/couple interaction observations:   Other:    Current risk:    Suicide: Low   Homicide: Low   Self-harm: Low  Relapse: Low  Other:   Crisis Safety Plan reviewed?Yes  If evidence of imminent risk is present, intervention/plan:    Medical Records/Labs/Diagnostic Tests Reviewed: yes.    Medical Records/Labs/Diagnostic Tests Ordered: none.    DIAGNOSTIC IMPRESSION(S):  1. Mild single current episode of major depressive disorder (CMS-[facility])           ASSESSMENT AND PLAN:  Major depression, single episode  Continue lexapro 10 mg per day  Follow up in six weeks.    16 minutes were spent in psychotherapy.  (If greater than 16 minutes, add 11625 code)   Topics addressed in psychotherapy include:  The patient is able to establish social contacts and activities.  The patients depressive symptoms related to his brake up from the relationship.  The patient was reassured the feeling are associated with grief and once tolerated will subside over time.   Kevin Rivera M.D.